# Patient Record
Sex: FEMALE | Race: WHITE | Employment: FULL TIME | URBAN - METROPOLITAN AREA
[De-identification: names, ages, dates, MRNs, and addresses within clinical notes are randomized per-mention and may not be internally consistent; named-entity substitution may affect disease eponyms.]

---

## 2020-02-15 ENCOUNTER — APPOINTMENT (OUTPATIENT)
Dept: GENERAL RADIOLOGY | Facility: CLINIC | Age: 45
End: 2020-02-15
Attending: EMERGENCY MEDICINE

## 2020-02-15 ENCOUNTER — HOSPITAL ENCOUNTER (EMERGENCY)
Facility: CLINIC | Age: 45
Discharge: HOME | End: 2020-02-15
Attending: EMERGENCY MEDICINE | Admitting: EMERGENCY MEDICINE

## 2020-02-15 VITALS
RESPIRATION RATE: 13 BRPM | BODY MASS INDEX: 25.2 KG/M2 | HEIGHT: 59 IN | WEIGHT: 125 LBS | TEMPERATURE: 101.7 F | SYSTOLIC BLOOD PRESSURE: 112 MMHG | DIASTOLIC BLOOD PRESSURE: 59 MMHG | HEART RATE: 90 BPM | OXYGEN SATURATION: 96 %

## 2020-02-15 DIAGNOSIS — J10.1 INFLUENZA A: ICD-10-CM

## 2020-02-15 LAB
ALBUMIN SERPL-MCNC: 3.9 G/DL (ref 3.4–5)
ALP SERPL-CCNC: 75 U/L (ref 40–150)
ALT SERPL W P-5'-P-CCNC: 17 U/L (ref 0–50)
ANION GAP SERPL CALCULATED.3IONS-SCNC: 5 MMOL/L (ref 3–14)
AST SERPL W P-5'-P-CCNC: 15 U/L (ref 0–45)
BASOPHILS # BLD AUTO: 0 10E9/L (ref 0–0.2)
BASOPHILS NFR BLD AUTO: 0.4 %
BILIRUB SERPL-MCNC: 0.1 MG/DL (ref 0.2–1.3)
BUN SERPL-MCNC: 7 MG/DL (ref 7–30)
CALCIUM SERPL-MCNC: 8.7 MG/DL (ref 8.5–10.1)
CHLORIDE SERPL-SCNC: 106 MMOL/L (ref 94–109)
CO2 SERPL-SCNC: 26 MMOL/L (ref 20–32)
CREAT SERPL-MCNC: 0.64 MG/DL (ref 0.52–1.04)
DIFFERENTIAL METHOD BLD: NORMAL
EOSINOPHIL # BLD AUTO: 0 10E9/L (ref 0–0.7)
EOSINOPHIL NFR BLD AUTO: 0.4 %
ERYTHROCYTE [DISTWIDTH] IN BLOOD BY AUTOMATED COUNT: 13.6 % (ref 10–15)
FLUAV+FLUBV AG SPEC QL: NEGATIVE
FLUAV+FLUBV AG SPEC QL: POSITIVE
GFR SERPL CREATININE-BSD FRML MDRD: >90 ML/MIN/{1.73_M2}
GLUCOSE SERPL-MCNC: 85 MG/DL (ref 70–99)
HCG SERPL QL: NEGATIVE
HCT VFR BLD AUTO: 41 % (ref 35–47)
HGB BLD-MCNC: 14 G/DL (ref 11.7–15.7)
IMM GRANULOCYTES # BLD: 0 10E9/L (ref 0–0.4)
IMM GRANULOCYTES NFR BLD: 0.3 %
LYMPHOCYTES # BLD AUTO: 1.5 10E9/L (ref 0.8–5.3)
LYMPHOCYTES NFR BLD AUTO: 20.7 %
MCH RBC QN AUTO: 30.4 PG (ref 26.5–33)
MCHC RBC AUTO-ENTMCNC: 34.1 G/DL (ref 31.5–36.5)
MCV RBC AUTO: 89 FL (ref 78–100)
MONOCYTES # BLD AUTO: 0.5 10E9/L (ref 0–1.3)
MONOCYTES NFR BLD AUTO: 6.8 %
NEUTROPHILS # BLD AUTO: 5.2 10E9/L (ref 1.6–8.3)
NEUTROPHILS NFR BLD AUTO: 71.4 %
NRBC # BLD AUTO: 0 10*3/UL
NRBC BLD AUTO-RTO: 0 /100
PLATELET # BLD AUTO: 217 10E9/L (ref 150–450)
POTASSIUM SERPL-SCNC: 3.8 MMOL/L (ref 3.4–5.3)
PROT SERPL-MCNC: 7 G/DL (ref 6.8–8.8)
RBC # BLD AUTO: 4.61 10E12/L (ref 3.8–5.2)
SODIUM SERPL-SCNC: 137 MMOL/L (ref 133–144)
SPECIMEN SOURCE: ABNORMAL
WBC # BLD AUTO: 7.3 10E9/L (ref 4–11)

## 2020-02-15 PROCEDURE — 84703 CHORIONIC GONADOTROPIN ASSAY: CPT | Performed by: EMERGENCY MEDICINE

## 2020-02-15 PROCEDURE — 99284 EMERGENCY DEPT VISIT MOD MDM: CPT | Mod: 25

## 2020-02-15 PROCEDURE — 85025 COMPLETE CBC W/AUTO DIFF WBC: CPT | Performed by: EMERGENCY MEDICINE

## 2020-02-15 PROCEDURE — 25000128 H RX IP 250 OP 636: Performed by: EMERGENCY MEDICINE

## 2020-02-15 PROCEDURE — 71046 X-RAY EXAM CHEST 2 VIEWS: CPT

## 2020-02-15 PROCEDURE — 80053 COMPREHEN METABOLIC PANEL: CPT | Performed by: EMERGENCY MEDICINE

## 2020-02-15 PROCEDURE — 25000132 ZZH RX MED GY IP 250 OP 250 PS 637: Performed by: EMERGENCY MEDICINE

## 2020-02-15 PROCEDURE — 25800030 ZZH RX IP 258 OP 636: Performed by: EMERGENCY MEDICINE

## 2020-02-15 PROCEDURE — 87804 INFLUENZA ASSAY W/OPTIC: CPT | Performed by: EMERGENCY MEDICINE

## 2020-02-15 PROCEDURE — 25000125 ZZHC RX 250: Performed by: EMERGENCY MEDICINE

## 2020-02-15 RX ORDER — IBUPROFEN 600 MG/1
600 TABLET, FILM COATED ORAL ONCE
Status: COMPLETED | OUTPATIENT
Start: 2020-02-15 | End: 2020-02-15

## 2020-02-15 RX ORDER — ACETAMINOPHEN 500 MG
1000 TABLET ORAL ONCE
Status: COMPLETED | OUTPATIENT
Start: 2020-02-15 | End: 2020-02-15

## 2020-02-15 RX ORDER — ONDANSETRON 4 MG/1
4 TABLET, ORALLY DISINTEGRATING ORAL EVERY 6 HOURS PRN
Qty: 10 TABLET | Refills: 0 | Status: SHIPPED | OUTPATIENT
Start: 2020-02-15 | End: 2020-02-18

## 2020-02-15 RX ORDER — ONDANSETRON 2 MG/ML
4 INJECTION INTRAMUSCULAR; INTRAVENOUS ONCE
Status: COMPLETED | OUTPATIENT
Start: 2020-02-15 | End: 2020-02-15

## 2020-02-15 RX ADMIN — ACETAMINOPHEN 1000 MG: 500 TABLET, FILM COATED ORAL at 16:59

## 2020-02-15 RX ADMIN — SODIUM CHLORIDE 1000 ML: 9 INJECTION, SOLUTION INTRAVENOUS at 15:14

## 2020-02-15 RX ADMIN — ONDANSETRON 4 MG: 2 INJECTION INTRAMUSCULAR; INTRAVENOUS at 16:58

## 2020-02-15 RX ADMIN — LIDOCAINE HYDROCHLORIDE 30 ML: 20 SOLUTION ORAL; TOPICAL at 16:10

## 2020-02-15 RX ADMIN — IBUPROFEN 600 MG: 600 TABLET ORAL at 17:40

## 2020-02-15 ASSESSMENT — ENCOUNTER SYMPTOMS
MYALGIAS: 1
CHEST TIGHTNESS: 1
SORE THROAT: 1
COUGH: 1
FEVER: 0
SHORTNESS OF BREATH: 1
CHILLS: 1

## 2020-02-15 ASSESSMENT — MIFFLIN-ST. JEOR: SCORE: 1132.63

## 2020-02-15 NOTE — ED NOTES
RN at bedside, pt still reports feeling febrile. Recheck temp still elevated. RN to give pt ibuprofen

## 2020-02-15 NOTE — ED PROVIDER NOTES
History     Chief Complaint:    Shortness of Breath    The history is provided by the patient.      Oneida Box is a 44 year old female smoker who presents with shortness of breath. The patient states that yesterday she developed a cough, which has worsened today. An hour and a half before ED arrival, the patient was coughing harder at work and began experiencing shortness of breath. She states that it felt like something was stuck in her chest. She also endorses bilateral ear pain, sore throat, myalgias, chills, chest tightness, and chest pain. She rates the severity of her pain as 9/10. The patient denies any fevers, leg pain, or leg swelling. She does not have a history of blood clots. She denies any concern for pregnancy and is currently menstruating, which is normal timing for her. The patient has taken ibuprofen for her symptoms. Of note: the patient was warming febreeze on the stove at home earlier today.     Allergies:  No Known Drug Allergies     Medications:    [id number]    Past Medical History:    Endometriosis     Past Surgical History:    The patient does not have any pertinent past surgical history.    Family History:    No past pertinent family history.    Social History:  Positive for tobacco use - less than 0.50 packs/day. Denies vaping.  Negative for alcohol use.  Negative for drug use.  Marital Status:  Unknown.    Review of Systems   Constitutional: Positive for chills. Negative for fever.   HENT: Positive for ear pain (bilateral) and sore throat.    Respiratory: Positive for cough, chest tightness and shortness of breath.    Cardiovascular: Positive for chest pain. Negative for leg swelling.   Musculoskeletal: Positive for myalgias.        Denies leg pain   All other systems reviewed and are negative.      Physical Exam     Patient Vitals for the past 24 hrs:   BP Temp Temp src Pulse Heart Rate Resp SpO2 Height Weight   02/15/20 1720 (!) 154/85 -- -- -- 93 21 98 % -- --  "  02/15/20 1710 -- -- -- -- 86 22 100 % -- --   02/15/20 1650 137/72 101.7  F (38.7  C) Oral -- 80 11 99 % -- --   02/15/20 1645 137/72 -- -- 80 79 14 99 % -- --   02/15/20 1630 135/74 -- -- 83 86 14 99 % -- --   02/15/20 1615 (!) 145/86 -- -- 87 78 20 100 % -- --   02/15/20 1600 135/75 -- -- 80 82 -- 100 % -- --   02/15/20 1530 138/88 -- -- 78 78 (!) 33 99 % -- --   02/15/20 1500 (!) 160/95 -- -- 81 71 20 -- -- --   02/15/20 1449 (!) 205/189 98.9  F (37.2  C) Oral 82 82 18 97 % 1.499 m (4' 11\") 56.7 kg (125 lb)     Physical Exam  General: Alert and cooperative with exam. Patient in mild distress. Normal mentation. Anxious in appearance.  Head:  Scalp is NC/AT  Eyes:  No scleral icterus, PERRL  ENT:  The external nose and ears are normal. The oropharynx is normal and without erythema; mucus membranes are moist. Uvula midline, no evidence of deep space infection. Moderate cough.  TMs clear bilaterally  Neck:  Normal range of motion without rigidity.  CV:  Regular rate and rhythm    No pathologic murmur   Resp:  Breath sounds are clear bilaterally    Non-labored, no retractions or accessory muscle use  GI:  Abdomen is soft, no distension, no tenderness. No peritoneal signs  MS:  No lower extremity edema   Skin:  Warm and dry, No rash or lesions noted.  Neuro:  Oriented x 3. No gross motor deficits.    Emergency Department Course     Imaging:  Radiology findings were communicated with the patient who voiced understanding of the findings.    XR  Chest, PA & LAT:   Negative chest, as per radiology.     Laboratory:  Laboratory findings were communicated with the patient who voiced understanding of the findings.    CBC: AWNL (WBC 7.3, HGB 14.0, )  CMP: Bilirubin 0.1 L o/w WNL (Creatinine 0.64)  HCG Qualitative pregnancy (blood): Negative    Influenza A.B antigen: Influenza A positive    Interventions:  1514: 0.9% sodium chloride BOLUS 1 L IV  1610: Xylocaine & Maalox ES 30 mL PO  1658: Zofran 4 mg IV  1659: Tylenol " 1,000 mg PO  1740: ibuprofen 600 mg PO    Emergency Department Course:  Past medical records, nursing notes, and vitals reviewed.    1557: I performed an exam of the patient as documented above.     IV was inserted and blood was drawn for laboratory testing, results above.    The patient was sent for a chest x-ray while in the emergency department, results above.     1603: I rechecked the patient and discussed the results of her workup thus far.     I personally reviewed the laboratory and imaging results with the Patient and answered all related questions prior to discharge.     Findings and plan explained to the Patient. Patient discharged home with instructions regarding supportive care, medications, and reasons to return. The importance of close follow-up was reviewed.     1805: Patient rechecked and updated.     Impression & Plan     Medical Decision Making:  Oneida Box is a 44 year old female who presents for evaluation of cough, shortness of breath, and myalgias.  They have other symptoms including chest pain, ear pain, sore throat, chills, and chest tightness.   This is consistent with influenza.  Patient does not have any risk factors for complications from influenza; Tamiflu deferred after discussion with patient.  They are at risk for pneumonia but no signs of this are detected on today's visit.  Close followup of primary care physician is indicated and return to the ED for high fevers for more than 48 hours more, increasing productive cough, shortness of breath, or confusion.  There is no signs of serious bacterial infection such as bacteremia, meningitis, UTI/pyelonephritis, strep pharyngitis, etc.      Diagnosis:    ICD-10-CM   1. Influenza A J10.1     Disposition:  Discharged to home.    Discharge Medications:  Discharge Medication List as of 2/15/2020  6:27 PM      START taking these medications    Details   ondansetron (ZOFRAN ODT) 4 MG ODT tab Take 1 tablet (4 mg) by mouth every 6 hours as  needed, Disp-10 tablet, R-0, Local Print           Scribe Disclosure:  I, Alayna Calvo, am serving as a scribe at 4:03 PM on 2/15/2020 to document services personally performed by Arcadio Agustin DO based on my observations and the provider's statements to me.     Alayna Calvo  2/15/2020    EMERGENCY DEPARTMENT       Arcadio Agustin DO  02/15/20 2059

## 2020-02-15 NOTE — ED AVS SNAPSHOT
Emergency Department  [address] [place] [id number]  Phone:  [phone]  Fax:  [phone]                                    Oneida Box   MRN: [id number]    Department:   Emergency Department   Date of Visit:  2/15/2020           After Visit Summary Signature Page    I have received my discharge instructions, and my questions have been answered. I have discussed any challenges I see with this plan with the nurse or doctor.    ..........................................................................................................................................  Patient/Patient Representative Signature      ..........................................................................................................................................  Patient Representative Print Name and Relationship to Patient    ..................................................               ................................................  Date                                   Time    ..........................................................................................................................................  Reviewed by Signature/Title    ...................................................              ..............................................  Date                                               Time          22EPIC Rev 08/18

## 2020-02-15 NOTE — ED NOTES
Rn called to bedside, pt hyperventilating/cry and having nasal drainage. Pt states she thinks something is stuck in her throat but is able to swallow her own secretions and is speaking clearly. Pt placed on 02 for comfort

## 2020-02-15 NOTE — LETTER
February 15, 2020      To Whom It May Concern:      Oneida Box was seen in our Emergency Department today, 02/15/20.  Patient was diagnosed with influenza and must be fever free for 24 hours before returning to work. Symptoms expected to improve in the next 3-7 days. Thank you.      Sincerely,